# Patient Record
Sex: FEMALE | Race: WHITE | Employment: STUDENT | URBAN - METROPOLITAN AREA
[De-identification: names, ages, dates, MRNs, and addresses within clinical notes are randomized per-mention and may not be internally consistent; named-entity substitution may affect disease eponyms.]

---

## 2018-01-12 ENCOUNTER — HOSPITAL ENCOUNTER (OUTPATIENT)
Age: 28
Discharge: HOME | End: 2018-01-12
Attending: FAMILY MEDICINE
Payer: COMMERCIAL

## 2018-01-12 VITALS
HEIGHT: 67 IN | SYSTOLIC BLOOD PRESSURE: 124 MMHG | HEART RATE: 75 BPM | WEIGHT: 180 LBS | DIASTOLIC BLOOD PRESSURE: 67 MMHG | RESPIRATION RATE: 12 BRPM | TEMPERATURE: 98 F | BODY MASS INDEX: 28.25 KG/M2 | OXYGEN SATURATION: 99 %

## 2018-01-12 DIAGNOSIS — N30.00 ACUTE CYSTITIS WITHOUT HEMATURIA: Primary | ICD-10-CM

## 2018-01-12 LAB
B-HCG UR QL: NEGATIVE
URINE BILIRUBIN: NEGATIVE
URINE CLARITY: CLEAR
URINE COLOR: YELLOW
URINE GLUCOSE: NEGATIVE MG/DL
URINE KETONES: NEGATIVE MG/DL
URINE NITRITE: NEGATIVE
URINE PH: 5
URINE PROTEIN: NEGATIVE MG /DL
URINE SPECIFIC GRAVITY: <=1.005
URINE UROBILINOGEN: 0.2 MG/DL

## 2018-01-12 PROCEDURE — 87086 URINE CULTURE/COLONY COUNT: CPT | Performed by: FAMILY MEDICINE

## 2018-01-12 PROCEDURE — 81002 URINALYSIS NONAUTO W/O SCOPE: CPT

## 2018-01-12 PROCEDURE — 99204 OFFICE O/P NEW MOD 45 MIN: CPT

## 2018-01-12 PROCEDURE — 81025 URINE PREGNANCY TEST: CPT

## 2018-01-12 PROCEDURE — 99203 OFFICE O/P NEW LOW 30 MIN: CPT

## 2018-01-12 RX ORDER — MAGNESIUM OXIDE 400 MG (241.3 MG MAGNESIUM) TABLET
400 TABLET DAILY
COMMUNITY

## 2018-01-12 RX ORDER — MULTIVITAMIN
1 TABLET ORAL DAILY
COMMUNITY

## 2018-01-12 RX ORDER — CITALOPRAM 20 MG/1
20 TABLET ORAL DAILY
COMMUNITY

## 2018-01-12 RX ORDER — NITROFURANTOIN 25; 75 MG/1; MG/1
100 CAPSULE ORAL 2 TIMES DAILY
Qty: 14 CAPSULE | Refills: 0 | Status: SHIPPED | OUTPATIENT
Start: 2018-01-12 | End: 2018-01-19

## 2018-01-12 NOTE — ED PROVIDER NOTES
[address]    History   Patient presents with:  Urinary Symptoms (urologic)    Stated Complaint: possible uti    HPI    Patient complains of urinary frequency, urgency and dysuria that began 1 day ago. resp distress  CARDIO:regular rate  GI: soft, mild suprapubic tenderness, no guarding  BACK: no CVA tenderness  EXTREMITIES: from, no edema    ED Course     Labs Reviewed   [facility] POCT URINALYSIS DIPSTICK MANUAL - Abnormal; Notable for the following:        R

## 2018-01-12 NOTE — ED INITIAL ASSESSMENT (HPI)
Patient comes in with urinary symptoms, frequency, pain, urgency for the past one day. Denies back pain.